# Patient Record
Sex: FEMALE | Race: WHITE | Employment: OTHER | URBAN - METROPOLITAN AREA
[De-identification: names, ages, dates, MRNs, and addresses within clinical notes are randomized per-mention and may not be internally consistent; named-entity substitution may affect disease eponyms.]

---

## 2021-08-27 ENCOUNTER — APPOINTMENT (OUTPATIENT)
Dept: GENERAL RADIOLOGY | Age: 76
End: 2021-08-27
Payer: MEDICARE

## 2021-08-27 ENCOUNTER — HOSPITAL ENCOUNTER (EMERGENCY)
Age: 76
Discharge: HOME | End: 2021-08-27
Attending: EMERGENCY MEDICINE
Payer: MEDICARE

## 2021-08-27 VITALS
HEIGHT: 63 IN | DIASTOLIC BLOOD PRESSURE: 50 MMHG | OXYGEN SATURATION: 97 % | RESPIRATION RATE: 20 BRPM | TEMPERATURE: 100.5 F | SYSTOLIC BLOOD PRESSURE: 112 MMHG | BODY MASS INDEX: 27.66 KG/M2 | HEART RATE: 90 BPM | WEIGHT: 156.09 LBS

## 2021-08-27 DIAGNOSIS — M25.552 LEFT HIP PAIN: ICD-10-CM
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
## 2021-08-27 DIAGNOSIS — B33.8 RESPIRATORY SYNCYTIAL VIRUS (RSV): ICD-10-CM

## 2021-08-27 LAB
A/G RATIO: 1.5 (ref 1.1–2.2)
ALBUMIN SERPL-MCNC: 3.5 G/DL (ref 3.4–5)
ALP BLD-CCNC: 66 U/L (ref 40–129)
ALT SERPL-CCNC: 14 U/L (ref 10–40)
ANION GAP SERPL CALCULATED.3IONS-SCNC: 9 MMOL/L (ref 3–16)
AST SERPL-CCNC: 13 U/L (ref 15–37)
BASOPHILS ABSOLUTE: 0 K/UL (ref 0–0.2)
BASOPHILS RELATIVE PERCENT: 0.6 %
BILIRUB SERPL-MCNC: 0.5 MG/DL (ref 0–1)
BILIRUBIN URINE: NEGATIVE
BLOOD, URINE: NEGATIVE
BUN BLDV-MCNC: 14 MG/DL (ref 7–20)
CALCIUM SERPL-MCNC: 8.6 MG/DL (ref 8.3–10.6)
CHLORIDE BLD-SCNC: 98 MMOL/L (ref 99–110)
CLARITY: CLEAR
CO2: 25 MMOL/L (ref 21–32)
COLOR: YELLOW
CREAT SERPL-MCNC: 0.9 MG/DL (ref 0.6–1.2)
EOSINOPHILS ABSOLUTE: 0.2 K/UL (ref 0–0.6)
EOSINOPHILS RELATIVE PERCENT: 3 %
GFR AFRICAN AMERICAN: >60
GFR NON-AFRICAN AMERICAN: >60
GLOBULIN: 2.4 G/DL
GLUCOSE BLD-MCNC: 119 MG/DL (ref 70–99)
GLUCOSE URINE: NEGATIVE MG/DL
HCT VFR BLD CALC: 33 % (ref 36–48)
HEMOGLOBIN: 11 G/DL (ref 12–16)
KETONES, URINE: NEGATIVE MG/DL
LEUKOCYTE ESTERASE, URINE: NEGATIVE
LYMPHOCYTES ABSOLUTE: 1.2 K/UL (ref 1–5.1)
LYMPHOCYTES RELATIVE PERCENT: 20.6 %
MCH RBC QN AUTO: 30.2 PG (ref 26–34)
MCHC RBC AUTO-ENTMCNC: 33.5 G/DL (ref 31–36)
MCV RBC AUTO: 90.1 FL (ref 80–100)
MICROSCOPIC EXAMINATION: NORMAL
MONOCYTES ABSOLUTE: 0.6 K/UL (ref 0–1.3)
MONOCYTES RELATIVE PERCENT: 10.8 %
NEUTROPHILS ABSOLUTE: 3.7 K/UL (ref 1.7–7.7)
NEUTROPHILS RELATIVE PERCENT: 65 %
NITRITE, URINE: NEGATIVE
PDW BLD-RTO: 15.2 % (ref 12.4–15.4)
PH UA: 6 (ref 5–8)
PLATELET # BLD: 219 K/UL (ref 135–450)
PMV BLD AUTO: 7.8 FL (ref 5–10.5)
POTASSIUM REFLEX MAGNESIUM: 3.7 MMOL/L (ref 3.5–5.1)
PROTEIN UA: NEGATIVE MG/DL
RBC # BLD: 3.66 M/UL (ref 4–5.2)
SODIUM BLD-SCNC: 132 MMOL/L (ref 136–145)
SPECIFIC GRAVITY UA: 1.01 (ref 1–1.03)
TOTAL PROTEIN: 5.9 G/DL (ref 6.4–8.2)
URINE REFLEX TO CULTURE: NORMAL
URINE TYPE: NORMAL
UROBILINOGEN, URINE: 0.2 E.U./DL
WBC # BLD: 5.7 K/UL (ref 4–11)

## 2021-08-27 PROCEDURE — 85025 COMPLETE CBC W/AUTO DIFF WBC: CPT

## 2021-08-27 PROCEDURE — 99285 EMERGENCY DEPT VISIT HI MDM: CPT

## 2021-08-27 PROCEDURE — 2580000003 HC RX 258: Performed by: EMERGENCY MEDICINE

## 2021-08-27 PROCEDURE — 71046 X-RAY EXAM CHEST 2 VIEWS: CPT

## 2021-08-27 PROCEDURE — 80053 COMPREHEN METABOLIC PANEL: CPT

## 2021-08-27 PROCEDURE — 73560 X-RAY EXAM OF KNEE 1 OR 2: CPT

## 2021-08-27 PROCEDURE — 81003 URINALYSIS AUTO W/O SCOPE: CPT

## 2021-08-27 PROCEDURE — 73502 X-RAY EXAM HIP UNI 2-3 VIEWS: CPT

## 2021-08-27 RX ORDER — ACETAMINOPHEN 325 MG/1
650 TABLET ORAL ONCE
Status: DISCONTINUED | OUTPATIENT
Start: 2021-08-27 | End: 2021-08-27

## 2021-08-27 RX ORDER — 0.9 % SODIUM CHLORIDE 0.9 %
1000 INTRAVENOUS SOLUTION INTRAVENOUS ONCE
Status: COMPLETED | OUTPATIENT
Start: 2021-08-27 | End: 2021-08-27

## 2021-08-27 RX ORDER — BENZONATATE 100 MG/1
100 CAPSULE ORAL 3 TIMES DAILY PRN
Qty: 21 CAPSULE | Refills: 0 | Status: SHIPPED | OUTPATIENT
Start: 2021-08-27 | End: 2021-09-03

## 2021-08-27 RX ADMIN — SODIUM CHLORIDE 1000 ML: 9 INJECTION, SOLUTION INTRAVENOUS at 07:52

## 2021-08-27 ASSESSMENT — PAIN SCALES - GENERAL
PAINLEVEL_OUTOF10: 6
PAINLEVEL_OUTOF10: 5

## 2021-08-27 ASSESSMENT — PAIN DESCRIPTION - FREQUENCY: FREQUENCY: INTERMITTENT

## 2021-08-27 ASSESSMENT — PAIN DESCRIPTION - ORIENTATION: ORIENTATION: LEFT

## 2021-08-27 ASSESSMENT — PAIN DESCRIPTION - DESCRIPTORS: DESCRIPTORS: SHARP

## 2021-08-27 ASSESSMENT — PAIN DESCRIPTION - LOCATION: LOCATION: HIP

## 2021-08-27 ASSESSMENT — PAIN DESCRIPTION - PAIN TYPE: TYPE: ACUTE PAIN

## 2021-08-27 NOTE — ED PROVIDER NOTES
CHIEF COMPLAINT  Fall (pt c/o weakness and dizziness, dx with rsv yesterday )      HISTORY OF PRESENT ILLNESS  Aba Lane is a 78 y.o. female who  has a past medical history of Chronic kidney disease. presents to the ED complaining of general weakness and some sinus pressure this been present over the past few days. Patient states she was seen yesterday at [facility] outpatient lab testing for nasal congestion and sore throat. Patient states she had a COVID-19 and RSV swab obtained which came back positive for RSV. Covid swab was negative. Patient states that this morning when she got out of bed she felt generally weak and slid to the ground next to her bed. Patient states she was unable to get up on her own secondary to general weakness. Denies any focal weakness. No head trauma or loss of consciousness. Denies any chest pain or shortness of breath. Denies any abdominal pain, nausea or vomiting. Has had decreased appetite secondary to the nasal congestion. States he is having normal urinary and bowel habits. .   No other complaints, modifying factors or associated symptoms. Nursing notes reviewed. Past Medical History:   Diagnosis Date    Chronic kidney disease      History reviewed. No pertinent surgical history. History reviewed. No pertinent family history. Social History     Socioeconomic History    Marital status:       Spouse name: Not on file    Number of children: Not on file    Years of education: Not on file    Highest education level: Not on file   Occupational History    Not on file   Tobacco Use    Smoking status: Not on file   Substance and Sexual Activity    Alcohol use: Not on file    Drug use: Not on file    Sexual activity: Not on file   Other Topics Concern    Not on file   Social History Narrative    Not on file     Social Determinants of Health     Financial Resource Strain:     Difficulty of Paying Living Expenses:    Food Insecurity:     Worried About problems. IVP Dye-No anaphylaxis. Pt states that when she was 24years old, she got nauseous and vomited and had a slight drop in BP. No other symptoms. She has since had a CTPA (2006) without any noted problems. REVIEW OF SYSTEMS  10 systems reviewed, pertinent positives per HPI otherwise noted to be negative    PHYSICAL EXAM  BP (!) 100/56   Pulse 82   Temp 100.2 °F (37.9 °C) (Oral)   Resp 22   Ht 5' 3\" (1.6 m)   Wt 156 lb 1.4 oz (70.8 kg)   SpO2 95%   BMI 27.65 kg/m²      CONSTITUTIONAL: AOx4, cooperative with exam, afebrile   HEAD: normocephalic, atraumatic   EYES: PERRL, EOMI, anicteric sclera   ENT: Moist mucous membranes, uvula midline,    NECK: Supple, symmetric, trachea midline, no stridor, no midline tenderness of the cervical spine   LUNGS: Bilateral breath sounds, CTAB, no rales/ronchi/wheezes   CARDIOVASCULAR: RRR, normal S1/S2, no m/r/g, 2+ pulses throughout   ABDOMEN: Soft, non-tender, non-distended, +BS   NEUROLOGIC:  MAEx4, 5/5 strength throughout; fine touch sensation intact throughout; normal gait; GCS 15   MUSCULOSKELETAL:  Mild tenderness over the left greater trochanter, no overlying skin changes, full range of motion left hip without pain, compartment soft left lower extremity, mild tenderness of the medial joint line of the left knee, no overlying skin changes, crepitus or deformity. No erythema or warmth of the left knee compared to the right, DP pulse 2+ bilaterally   SKIN: No rash, pallor or wounds on exposed surfaces         RADIOLOGY  X-RAYS:  I have reviewed radiologic plain film image(s). ALL OTHER NON-PLAIN FILM IMAGES SUCH AS CT, ULTRASOUND AND MRI HAVE BEEN READ BY THE RADIOLOGIST. XR CHEST (2 VW)   Final Result   No acute cardiac or pulmonary disease. XR KNEE LEFT (1-2 VIEWS)   Final Result   No acute bone or joint abnormality. XR HIP LEFT (2-3 VIEWS)   Final Result   No acute bone or joint abnormality.                 EKG INTERPRETATION  None    PROCEDURES    ED COURSE/MDM  Fall, contusion, URI, viral syndrome, RSV  Patient seen and evaluated. History and physical as above. Nontoxic, afebrile. Patient presents with complaints of general fatigue as well as sliding out of her bed this morning and difficulty getting up from the floor. Patient denies any numbness or weakness. Neurologically intact on exam.  Alert and oriented x4 with no signs of head trauma. Was diagnosed with RSV yesterday and is having some nasal congestion. No complaints of chest pain or shortness of breath. Does have a slight fever. Patient initially states she took Tylenol few hours prior to arrival.  Offered ibuprofen. Plan for routine labs, chest x-ray, x-ray of the left hip and left knee. ED Course as of Aug 27 1025   Fri Aug 27, 2021   1014 Refused the ibuprofen. Patient updated on unremarkable blood work. Imaging unremarkable. Patient updated on results. Patient able to ambulate in the emergency room. Discussed discharge with outpatient follow-up. Patient agreeable. Return instruction provided. All questions answered prior to discharge. Patient discharged home with short course of Tessalon and phenylephrine nasal spray. [DS]      ED Course User Index  [DS] Marquita Disla MD     I estimate there is LOW risk for COMPARTMENT SYNDROME, DEEP VENOUS THROMBOSIS, SEPTIC ARTHRITIS, TENDON OR NEUROVASCULAR INJURY, thus I consider the discharge disposition reasonable. Irma Caceres and I have discussed the diagnosis and risks, and we agree with discharging home to follow-up with their primary doctor or the referral orthopedist. We also discussed returning to the Emergency Department immediately if new or worsening symptoms occur. We have discussed the symptoms which are most concerning (e.g., changing or worsening pain, numbness, weakness) that necessitate immediate return. Patient was given scripts for the following medications.  I counseled patient how to take these medications. New Prescriptions    BENZONATATE (TESSALON PERLES) 100 MG CAPSULE    Take 1 capsule by mouth 3 times daily as needed for Cough    PHENYLEPHRINE (JUSTICE-SYNEPHRINE) 0.25 % NASAL SPRAY    1 spray by Nasal route every 4 hours as needed for Congestion           CLINICAL IMPRESSION  1. Fall, initial encounter    2. Left hip pain    3. Respiratory syncytial virus (RSV)        Blood pressure (!) 100/56, pulse 82, temperature 100.2 °F (37.9 °C), temperature source Oral, resp. rate 22, height 5' 3\" (1.6 m), weight 156 lb 1.4 oz (70.8 kg), SpO2 95 %. DISPOSITION  Patient was discharged to home in good condition. Megan Rowan    Call today  For a follow up appointment. Disclaimer: All medical record entries made by [address] [place] [address] [address] [facility].       (Please note that this note was completed with a voice recognition program. Every attempt was made to edit the dictations, but inevitably there remain words that are mis-transcribed.)          Soha Juarez MD  08/27/21 7110

## 2021-10-15 ENCOUNTER — CLINICAL DOCUMENTATION (OUTPATIENT)
Dept: OTHER | Age: 76
End: 2021-10-15

## 2021-10-19 ENCOUNTER — HOSPITAL ENCOUNTER (OUTPATIENT)
Dept: PHYSICAL THERAPY | Age: 76
Setting detail: THERAPIES SERIES
Discharge: HOME | End: 2021-10-19
Payer: MEDICARE

## 2021-10-19 PROCEDURE — 97530 THERAPEUTIC ACTIVITIES: CPT

## 2021-10-19 PROCEDURE — 97162 PT EVAL MOD COMPLEX 30 MIN: CPT

## 2021-10-19 NOTE — FLOWSHEET NOTE
[facility]  Outpatient Rehabilitation and Therapy, [facility]  [address] [facility], [facility]  Phone: [phone]   Fax:     [phone]      Physical Therapy Treatment Note/ Progress Report:     Date:  10/19/2021    Patient Name:  Gwen Kirkpatrick    :  1945  MRN: [id number]    Pertinent Medical History:Additional Pertinent Hx: fibromyalgia, DDD lumbar, chronic bilateral LBP with right sciatica,  CKD,  ERNESTO< RLS, hyperparathyroidism    Medical/Treatment Diagnosis Information:  · Diagnosis: M79.7 (ICD-10-CM) - [id number] (ICD-10-CM) - Radiculopathy, lumbar region  · Treatment Diagnosis: Decreased functional mobility 2/2 general pain back and LE's    Insurance/Certification information:  PT Insurance Information: [employer]  Physician Information:  Referring Practitioner: Nasima Mulligan MD  Plan of care signed (Y/N): sent    Date of Patient follow up with Physician:      Progress Report: []  Yes  [x]  No     Date Range for reporting period:  Beginning:  10/19/2021  Ending:      Progress report due (10 Rx/or 30 days whichever is less):      Recertification due (POC duration/ or 90 days whichever is less):      Visit # POC/Insurance Allowable Auth Needed   1 ? [x]Yes   []No     Latex Allergy:  [x]NO      []YES  Preferred Language for Healthcare:   [x]English       []Other:      SUBJECTIVE: History of hip issues since   - Left hip abductor repair , left hip abductor repair . Arthritis in both feet, feels like she has neuropathies in both feet. Also notes arthritis in her spine, and back and shoulder pain as well as cervical pain and stiffness.   Notes history of recent illness and falls which has aggravated all of her pre-existing conditions     Relevant Medical History:Additional Pertinent Hx: fibromyalgia, DDD lumbar, chronic bilateral LBP with right sciatica,  CKD,  ERNESTO< RLS, hyperparathyroidism  Functional Scale/Score: womac   Raw score 67     Pain Scale: 4/10 - today - depends on what she is doing on what is going on that day  Easing factors:  Provocative factors:      Type: []? Constant       []? Intermittent  []? Radiating     []? Localized     []? other:                Numbness/Tingling: notes neuropathy in penelope feet     Occupation/School: retired     Living Status/Prior Level of Function: Independent with ADLs and IADLs,      OBJECTIVE:   Palpation: no specific TTP noted     Functional Mobility/Transfers: uses arms of chair for leverage          Posture:  penelope genu varum/ pes planus     Bandages/Dressings/Incisions:  NA     Gait: (include devices/WB status) antalgic gait , RLE ER, wide LAUREL + bilateral Trendelenberg - poor balance frequent falls     RESTRICTIONS/PRECAUTIONS: none noted    Exercises/Interventions:     Therapeutic Ex (79270)   Min: Reps/Resistance Notes/CUES   Review of prior HEP Pt was in the pool, has been unable to consistently tolerated land based exercise 2/2 multiple comorbidities/orthopedic issues              Therapeutic Activity (40518) Min:  30     Review of home safety 2/2/ falls Pt reports no throw rugs since she tripped over the last one         Review of gait safety with RW Practiced with RW keeping within frame 2/2 normal wide LAUREL Used to walk out to parking lot for safety   Pool tour, policies and procedures Pt expresses understanding. NMR re-education (56227)  Min:  CUES NEEDED             Manual Intervention (18752) Min:           Modalities  Min:            Other Therapeutic Activities: Pt was educated on PT POC, Diagnosis, Prognosis, pathomechanics as well as frequency and duration of scheduling future physical therapy appointments. Time was also taken on this day to answer all patient questions and participation in PT. Reviewed appointment policy in detail with patient and patient verbalized understanding.      Home Exercise Program: Patient pain, promoting relaxation,  increasing ROM, reducing/eliminating soft tissue swelling/inflammation/restriction, improving soft tissue extensibility and allowing for proper ROM for normal function with self care, mobility, lifting and ambulation. Charges:  Timed Code Treatment Minutes: 30   Total Treatment Minutes: 55      [] EVAL (LOW) 07319 (typically 20 minutes face-to-face)  [x] EVAL (MOD) 14420 (typically 30 minutes face-to-face)  [] EVAL (HIGH) 85825 (typically 45 minutes face-to-face)  [] RE-EVAL     [] KH(03301) x     [] Dry needle 1 or 2 Muscles (30728)  [] NMR (41024) x     [] Dry needle 3+ Muscles (90813)  [] Manual (80443) x     [] Ultrasound (56117) x  [x] TA (58524) x  2  [] Mech Traction (57790)  [] ES(attended) (13441)     [] ES (un) (64451):   [] Vasopump (40209) [] Ionto (04827)   [] Other:       ASSESSMENT Pt with history of chronic pain and muscle imbalance as well as physical imbalance and history of falls, resistant to use of AD. Will benefit from aquatic therapy to maximize mobility and decrease pain.     GOALS:  Patient stated goal: Less pain  []? Progressing: []? Met: []? Not Met: []? Adjusted     Therapist goals for Patient:   Short Term Goals: To be achieved in: 2 weeks  1. Independent in HEP and progression per patient tolerance, in order to prevent re-injury. []? Progressing: []? Met: []? Not Met: []? Adjusted  2. Patient will have a decrease in pain to facilitate improvement in movement, function, and ADLs as indicated by Functional Deficits. []? Progressing: []? Met: []? Not Met: []? Adjusted     Long Term Goals: To be achieved in:8 weeks  1. Disability index score of 40% or less for the LEFS to assist with reaching prior level of function. []? Progressing: []? Met: []? Not Met: []? Adjusted  2. Patient will demonstrate increased AROM to allow for proper joint functioning as indicated by patients Functional Deficits. []? Progressing: []? Met: []? Not Met: []? Adjusted  3. Patient will demonstrate an increase in Strength to good proximal hip strength and control, within 5lb HHD in LE to allow for proper functional mobility as indicated by patients Functional Deficits. []? Progressing: []? Met: []? Not Met: []? Adjusted  4. Patient will return to 60%functional activities without increased symptoms or restriction. []? Progressing: []? Met: []? Not Met: []? Adjusted  5. More strength and mobility for safe walking   []? Progressing: []? Met: []? Not Met: []    Treatment/Activity Tolerance:  [x] Patient tolerated treatment well [] Patient limited by fatique  [] Patient limited by pain  [] Patient limited by other medical complications  [] Other:     Overall Progression Towards Functional goals/ Treatment Progress Update:  [] Patient is progressing as expected towards functional goals listed. [] Progression is slowed due to complexities/Impairments listed. [] Progression has been slowed due to co-morbidities. [x] Plan just implemented, too soon to assess goals progression <30days   [] Goals require adjustment due to lack of progress  [] Patient is not progressing as expected and requires additional follow up with physician  [] Other    Prognosis for POC: [x] Good [] Fair  [] Poor    Patient requires continued skilled intervention: [x] Yes  [] No        PLAN:   [] Continue per plan of care [] Alter current plan (see comments)  [x] Plan of care initiated [] Hold pending MD visit [] Discharge    Electronically signed by: Naz Germain, [id number]    Note: If patient does not return for scheduled/recommended follow up visits, this note will serve as a discharge from care along with the most recent update on progress.

## 2021-10-19 NOTE — PLAN OF CARE
[address] [facility], [facility]  [address] [facility]  Phone: [phone]   Fax:     [phone]                                                       Physical Therapy Certification    Dear Referring Practitioner: Atif Woodard MD,    We had the pleasure of evaluating the following patient for physical therapy services at [facility]. A summary of our findings can be found in the initial assessment below. This includes our plan of care. If you have any questions or concerns regarding these findings, please do not hesitate to contact me at the office phone number checked above. Thank you for the referral.       Physician Signature:_______________________________Date:__________________  By signing above (or electronic signature), therapists plan is approved by physician              Patient: Miguel Livingston   : 1945   MRN: [id number]  Referring Physician: Referring Practitioner: Atif Woodard MD      Evaluation Date: 10/19/2021      Medical Diagnosis Information:  Diagnosis: M79.7 (ICD-10-CM) - [id number] (ICD-10-CM) - Radiculopathy, lumbar region   Treatment Diagnosis: Decreased functional mobility 2/2 general pain back and LE's                                         Insurance information: PT Insurance Information: [employer]     Precautions/ Contra-indications: none noted  Latex Allergy:  [x]NO      []YES  Preferred Language for Healthcare:   [x]English       []other:    C-SSRS Triggered by Intake questionnaire (Past 2 wk assessment ):   [x] No, Questionnaire did not trigger screening.   [] Yes, Patient intake triggered C-SSRS Screening      [] C-SSRS Screening completed  [] PCP notified via Epic     SUBJECTIVE: History of hip issues since   - Left hip abductor repair , left hip abductor repair .   Arthritis in both feet, feels like she has neuropathies in both feet. Also notes arthritis in her spine, and back and shoulder pain as well as cervical pain and stiffness. Notes history of recent illness and falls which has aggravated all of her pre-existing conditions    Relevant Medical History:Additional Pertinent Hx: fibromyalgia, DDD lumbar, chronic bilateral LBP with right sciatica,  CKD,  ERNESTO< RLS, hyperparathyroidism  Functional Scale/Score: womac   Raw score 67    Pain Scale: 4/10 - today - depends on what she is doing on what is going on that day  Easing factors:  Provocative factors:     Type: []Constant   []Intermittent  []Radiating []Localized []other:     Numbness/Tingling: notes neuropathy in penelope feet    Occupation/School: retired    Living Status/Prior Level of Function: Independent with ADLs and IADLs,     OBJECTIVE:   Palpation: no specific TTP noted    Functional Mobility/Transfers: uses arms of chair for leverage     Posture:  penelope genu varum/ pes planus    Bandages/Dressings/Incisions:  NA    Gait: (include devices/WB status) antalgic gait + bilateral Trendelenberg    Balance - feet together - increased lateral sway, unable to perform tandem or SLS    ROM LEFT RIGHT   HIP Flex TBE TBE   HIP Abd     HIP Ext     HIP IR     HIP ER     Knee ext wfl all wfl all   Knee Flex     Ankle PF     Ankle DF     Ankle In     Ankle Ev     Strength  LEFT RIGHT   HIP Flexors 4/5 4/5   HIP Abductors 2+/5 2/5   HIP Ext nt nt   Hip ER nt nt   Knee EXT (quad) wfl all wfl all   Knee Flex (HS) \" \"   Ankle DF wfl all wfl all   Ankle PF     Ankle Inv     Ankle EV          Circumference  Mid apex  7 cm prox             Reflexes/Sensation:    [x]Dermatomes/Myotomes intact    [x]Reflexes equal and normal bilaterally   []Other:    Joint mobility:    [x]Normal    []Hypo   []Hyper    Orthopedic Special Tests:                       [x] Patient history, allergies, meds reviewed. Medical chart reviewed. See intake form.      Review Of Systems (ROS):  [x]Performed Review of systems (Integumentary, CardioPulmonary, Neurological) by intake and observation. Intake form has been scanned into medical record. Patient has been instructed to contact their primary care physician regarding ROS issues if not already being addressed at this time. Co-morbidities/Complexities (which will affect course of rehabilitation):  fibromyalgia, DDD lumbar, chronic bilateral LBP with right sciatica,  CKD,  ERNESTO< RLS, hyperparathyroidism    []None        6   Arthritic conditions   []Rheumatoid arthritis (M05.9)  [x]Osteoarthritis (M19.91)   Cardiovascular conditions   []Hypertension (I10)  []Hyperlipidemia (E78.5)  []Angina pectoris (I20)  []Atherosclerosis (I70)  []CVA Musculoskeletal conditions   [x]Disc pathology   []Congenital spine pathologies   [x]Prior surgical intervention  []Osteoporosis (M81.8)  []Osteopenia (M85.8)   Endocrine conditions   []Hypothyroid (E03.9)  []Hyperthyroid Gastrointestinal conditions   []Constipation (I39.08)   Metabolic conditions   []Morbid obesity (E66.01)  [x]Diabetes type 1(E10.65) or 2 (E11.65)   [x]Neuropathy (G60.9)     Pulmonary conditions   []Asthma (J45)  []Coughing   []COPD (J44.9)   Psychological Disorders  []Anxiety (F41.9)  []Depression (F32.9)   []Other:   [x]Other:     fibromyalgia     Barriers to/and or personal factors that will affect rehab potential:              []Age  []Sex    []Smoker              []Motivation/Lack of Motivation                        [x]Co-Morbidities              []Cognitive Function, education/learning barriers              []Environmental, home barriers              []profession/work barriers  []past PT/medical experience  []other:  Justification:    Falls Risk Assessment (30 days): History of falls over the past 6 months, notes she has bad balance  [x] Falls Risk assessed and no intervention required.   [] Falls Risk assessed and Patient requires intervention due to being higher risk   TUG score (>12s at risk):     [x] Falls education provided, including  Recommended use of RW for safety         ASSESSMENT Pt with history of chronic pain and muscle imbalance as well as physical imbalance and history of falls, resistant to use of AD. Will benefit from aquatic therapy to maximize mobility and decrease pain. Functional Impairments:     []Noted lumbar/proximal hip/LE hypomobility   [x]Decreased LE functional ROM   [x]Decreased core/proximal hip strength and neuromuscular control   [x]Decreased LE functional strength   [x]Reduced balance/proprioceptive control   []other:      Functional Activity Limitations (from functional questionnaire and intake)   []Reduced ability to tolerate prolonged functional positions   [x]Reduced ability or difficulty with changes of positions or transfers between positions   [x]Reduced ability to maintain good posture and demonstrate good body mechanics with sitting, bending, and lifting   []Reduced ability to sleep   [] Reduced ability or tolerance with driving and/or computer work   [x]Reduced ability to perform lifting, carrying tasks   [x]Reduced ability to squat   []Reduced ability to forward bend   [x]Reduced ability to ambulate prolonged functional periods/distances/surfaces   [x]Reduced ability to ascend/descend stairs   [x]Reduced ability to run, hop or jump   []other:     Participation Restrictions   []Reduced participation in self care activities   [x]Reduced participation in home management activities   [x]Reduced participation in work activities   [x]Reduced participation in social activities. [x]Reduced participation in sport activities. Classification :    [x]Signs/symptoms consistent with post-surgical status including decreased ROM, strength and function.    []Signs/symptoms consistent with joint sprain/strain   []Signs/symptoms consistent with patella-femoral syndrome   []Signs/symptoms consistent with knee OA/hip OA   []Signs/symptoms consistent with internal derangement of knee/Hip   [x]Signs/symptoms consistent with functional hip weakness/NMR control      []Signs/symptoms consistent with tendinitis/tendinosis    []signs/symptoms consistent with pathology which may benefit from Dry needling      []other:      Prognosis/Rehab Potential:      []Excellent   [x]Good    []Fair   []Poor    Tolerance of evaluation/treatment:    []Excellent   [x]Good    []Fair   []Poor    Physical Therapy Evaluation Complexity Justification  [x] A history of present problem with:  [] no personal factors and/or comorbidities that impact the plan of care;  []1-2 personal factors and/or comorbidities that impact the plan of care  [x]3 personal factors and/or comorbidities that impact the plan of care  [x] An examination of body systems using standardized tests and measures addressing any of the following: body structures and functions (impairments), activity limitations, and/or participation restrictions;:  [] a total of 1-2 or more elements   [x] a total of 3 or more elements   [] a total of 4 or more elements   [x] A clinical presentation with:  [] stable and/or uncomplicated characteristics   [x] evolving clinical presentation with changing characteristics  [] unstable and unpredictable characteristics;   [x] Clinical decision making of [] low, [] moderate, [] high complexity using standardized patient assessment instrument and/or measurable assessment of functional outcome.     [] EVAL (LOW) 44529 (typically 20 minutes face-to-face)  [x] EVAL (MOD) 33588 (typically 30 minutes face-to-face)  [] EVAL (HIGH) 78660 (typically 45 minutes face-to-face)  [] RE-EVAL     PLAN:  Frequency/Duration:  2 days per week for 8 Weeks:  Interventions:  [x]  Therapeutic exercise including: strength training, ROM, for Lower extremity and core   [x]  NMR activation and proprioception for LE, Glutes and Core   []  Manual therapy as indicated for LE, Hip and spine to include: Dry Needling/IASTM, STM, PROM, Gr I-IV mobilizations, manipulation. [] Modalities as needed that may include: thermal agents, E-stim, Biofeedback, US, iontophoresis as indicated  [x] Patient education on joint protection, postural re-education, activity modification, progression of HEP. [x] Aquatic exercise including: strength training, ROM, and balance for Lower extremity and core     HEP instruction:flowsheet    GOALS:  Patient stated goal: Less pain  [] Progressing: [] Met: [] Not Met: [] Adjusted    Therapist goals for Patient:   Short Term Goals: To be achieved in: 2 weeks  1. Independent in HEP and progression per patient tolerance, in order to prevent re-injury. [] Progressing: [] Met: [] Not Met: [] Adjusted  2. Patient will have a decrease in pain to facilitate improvement in movement, function, and ADLs as indicated by Functional Deficits. [] Progressing: [] Met: [] Not Met: [] Adjusted    Long Term Goals: To be achieved in:8 weeks  1. Disability index score of 40% or less for the LEFS to assist with reaching prior level of function. [] Progressing: [] Met: [] Not Met: [] Adjusted  2. Patient will demonstrate increased AROM to allow for proper joint functioning as indicated by patients Functional Deficits. [] Progressing: [] Met: [] Not Met: [] Adjusted  3. Patient will demonstrate an increase in Strength to good proximal hip strength and control, within 5lb HHD in LE to allow for proper functional mobility as indicated by patients Functional Deficits. [] Progressing: [] Met: [] Not Met: [] Adjusted  4. Patient will return to 60%functional activities without increased symptoms or restriction. [] Progressing: [] Met: [] Not Met: [] Adjusted  5.  More strength and mobility for safe walking   [] Progressing: [] Met: [] Not Met: [] Adjusted     Electronically signed by:  Best Chowdhury, PT  [id number]      Note: If patient does not return for scheduled/recommended follow up visits, this note will serve as a discharge from care along with the most recent update on progress.

## 2022-01-19 ENCOUNTER — TELEPHONE (OUTPATIENT)
Dept: ORTHOPEDIC SURGERY | Age: 77
End: 2022-01-19

## 2022-01-19 NOTE — TELEPHONE ENCOUNTER
LVM for patient regarding the [address] [place] [address] Orthopedic joint pain program. Patient can call [phone] for more information or to schedule an appointment with a joint pain specialist.

## 2022-02-14 NOTE — PLAN OF CARE
[address] [facility], [facility]  [address] [facility]  Phone: [phone]   Fax:     [phone]      Physical Therapy Treatment Note/ Progress Report:   Date:  2022    Patient Name:  Kat Nj    :  1945  MRN: [id number]    Pertinent Medical History: Additional Pertinent Hx: CKD, RLS, fibromyalgia, sleep apnea, LE weakkness,  insomnia, humerus fx,  arthritis    Medical/Treatment Diagnosis Information:  · Diagnosis: I63.9 (ICD-10-CM) - CVA (cerebral vascular accident) ([facility] [address]  · Treatment Diagnosis: Decreased functional mobility post CVA    Insurance/Certification information:  PT Insurance Information: [employer]  Physician Information:  Referring Practitioner: Dora Tena MD  Plan of care signed (Y/N): []  Yes [x]  No     Date of Patient follow up with Physician:      Progress Report: []  Yes  [x]  No     Date Range for reporting period:  Beginnin2022  Ending:     Progress report due (10 Rx/or 30 days whichever is less):      Recertification due (POC duration/ or 90 days whichever is less):      Visit # Insurance Allowable Auth required?  Date Range   *** *** []  Yes []  No ***     Latex Allergy:  [x]NO      []YES  Preferred Language for Healthcare:   [x]English       []other:    Functional Outcomes Measure:   Date Assessed:  Test:  Score:    Pain level:  ***/10     History of Injury:    SUBJECTIVE:  See eval    OBJECTIVE: See eval      RESTRICTIONS/PRECAUTIONS: ***    Exercises/Interventions:     Therapeutic Exercises (89656) Min: Resistance/Reps Notes/Cues                            Therapeutic Activities (69460) Min:                              Neuromuscular Re-ed (35436) Min:                    Manual Intervention ([address] [facility]) Min:                Modalities  Min:                      Other Therapeutic Activities: Pt was educated on PT POC, Diagnosis, Prognosis, pathomechanics as well as frequency and duration of scheduling future physical therapy appointments. Time was also taken on this day to answer all patient questions and participation in PT. Reviewed appointment policy in detail with patient and patient verbalized understanding. Home Exercise Program: Patient was instructed in the following for HEP:     . Patient verbalized/demonstrated understanding and was issued written handout. Therapeutic Exercise and NMR EXR  [] (56591) Provided verbal/tactile cueing for activities related to strengthening, flexibility, endurance, ROM for improvements in LE, proximal hip, and core control with self care, mobility, lifting, ambulation.  [] (44899) Provided verbal/tactile cueing for activities related to improving balance, coordination, kinesthetic sense, posture, motor skill, proprioception  to assist with LE, proximal hip, and core control in self care, mobility, lifting, ambulation and eccentric single leg control.  [address] and Therapeutic Activities:    [] (01507 or 87397) Provided verbal/tactile cueing for activities related to improving balance, coordination, kinesthetic sense, posture, motor skill, proprioception and motor activation to allow for proper function of core, proximal hip and LE with self care and ADLs and functional mobility.   [] (73214) Gait Re-education- Provided training and instruction to the patient for proper LE, core and proximal hip recruitment and positioning and eccentric body weight control with ambulation re-education including up and down stairs     Home Exercise Program:    [x] (71562) Reviewed/Progressed HEP activities related to strengthening, flexibility, endurance, ROM of core, proximal hip and LE for functional self-care, mobility, lifting and ambulation/stair navigation   [] (36121)Reviewed/Progressed HEP activities related to improving balance, coordination, kinesthetic sense, posture, motor skill, proprioception of core, proximal hip and LE for self care, mobility, lifting, and ambulation/stair navigation      Manual Treatments:  PROM / STM / Oscillations-Mobs:  G-I, II, III, IV (PA's, Inf., Post.)  [] (48630) Provided manual therapy to mobilize LE, proximal hip and/or LS spine soft tissue/joints for the purpose of modulating pain, promoting relaxation,  increasing ROM, reducing/eliminating soft tissue swelling/inflammation/restriction, improving soft tissue extensibility and allowing for proper ROM for normal function with self care, mobility, lifting and ambulation. If [facility] Please Indicate Time In/Out  CPT Code Time in Time out                         Approval Dates:  CPT Code Units Approved Units Used  Date Updated:                     Charges:  Timed Code Treatment Minutes: ***   Total Treatment Minutes: ***      [] EVAL (LOW) 82809 (typically 20 minutes face-to-face)  [] EVAL (MOD) 28792 (typically 30 minutes face-to-face)  [] EVAL (HIGH) 71658 (typically 45 minutes face-to-face)  [] RE-EVAL     [] PS(63985) x     [] Dry needle 1 or 2 Muscles (51726)  [] NMR (59811) x     [] Dry needle 3+ Muscles (91105)  [] Manual (64927) x     [] Ultrasound (93762) x  [] TA (38382) x     [] Mech Traction (35494)  [] ES(attended) (20858)     [] ES (un) (68209):   [] Vasopump (94239) [] Ionto (27244)   [] Other:    GOALS:*** (cut and paste from eval)    ASSESSMENT:  See eval    Treatment/Activity Tolerance:  [x] Patient tolerated treatment well [] Patient limited by fatique  [] Patient limited by pain  [] Patient limited by other medical complications  [] Other:     Overall Progression Towards Functional goals/ Treatment Progress Update:  [] Patient is progressing as expected towards functional goals listed. [] Progression is slowed due to complexities/Impairments listed. [] Progression has been slowed due to co-morbidities.   [x] Plan just implemented, too soon to assess goals progression <30days   [] Goals require adjustment due to lack of progress  [] Patient is not progressing as expected and requires additional follow up with physician  [] Other    Prognosis for POC: [x] Good [] Fair  [] Poor    Patient requires continued skilled intervention: [x] Yes  [] No        PLAN:   [] Continue per plan of care [] Alter current plan (see comments)  [x] Plan of care initiated [] Hold pending MD visit [] Discharge    Electronically signed by: Brandon Maria PT    Note: If patient does not return for scheduled/recommended follow up visits, this note will serve as a discharge from care along with the most recent update on progress.

## 2022-02-15 ENCOUNTER — HOSPITAL ENCOUNTER (OUTPATIENT)
Dept: PHYSICAL THERAPY | Age: 77
Setting detail: THERAPIES SERIES
Discharge: HOME | End: 2022-02-15

## 2022-03-03 ENCOUNTER — HOSPITAL ENCOUNTER (OUTPATIENT)
Dept: CT IMAGING | Age: 77
Discharge: HOME | End: 2022-03-03
Payer: MEDICARE

## 2022-03-03 DIAGNOSIS — R63.4 WEIGHT LOSS: ICD-10-CM

## 2022-03-03 LAB
GFR AFRICAN AMERICAN: >60
GFR NON-AFRICAN AMERICAN: >60
PERFORMED ON: NORMAL
POC CREATININE: 0.7 MG/DL (ref 0.6–1.2)
POC SAMPLE TYPE: NORMAL

## 2022-03-03 PROCEDURE — 74177 CT ABD & PELVIS W/CONTRAST: CPT

## 2022-03-03 PROCEDURE — 6360000004 HC RX CONTRAST MEDICATION: Performed by: INTERNAL MEDICINE

## 2022-03-03 PROCEDURE — 82565 ASSAY OF CREATININE: CPT

## 2022-03-03 RX ADMIN — IOHEXOL 50 ML: 240 INJECTION, SOLUTION INTRATHECAL; INTRAVASCULAR; INTRAVENOUS; ORAL at 13:57

## 2022-03-03 RX ADMIN — IOPAMIDOL 75 ML: 755 INJECTION, SOLUTION INTRAVENOUS at 13:57
